# Patient Record
Sex: MALE | Race: WHITE | HISPANIC OR LATINO | Employment: FULL TIME | URBAN - METROPOLITAN AREA
[De-identification: names, ages, dates, MRNs, and addresses within clinical notes are randomized per-mention and may not be internally consistent; named-entity substitution may affect disease eponyms.]

---

## 2024-04-19 ENCOUNTER — OFFICE VISIT (OUTPATIENT)
Dept: URGENT CARE | Facility: URGENT CARE | Age: 24
End: 2024-04-19
Payer: OTHER MISCELLANEOUS

## 2024-04-19 VITALS
HEART RATE: 86 BPM | DIASTOLIC BLOOD PRESSURE: 58 MMHG | TEMPERATURE: 98.2 F | SYSTOLIC BLOOD PRESSURE: 113 MMHG | OXYGEN SATURATION: 99 %

## 2024-04-19 DIAGNOSIS — H91.92 HEARING LOSS OF LEFT EAR, UNSPECIFIED HEARING LOSS TYPE: ICD-10-CM

## 2024-04-19 DIAGNOSIS — Z86.69 HISTORY OF PERFORATED EAR DRUM: Primary | ICD-10-CM

## 2024-04-19 PROCEDURE — 99203 OFFICE O/P NEW LOW 30 MIN: CPT | Performed by: PHYSICIAN ASSISTANT

## 2024-04-19 NOTE — PROGRESS NOTES
Assessment & Plan     History of perforated ear drum    - Adult ENT  Referral    Hearing loss of left ear, unspecified hearing loss type    - Adult ENT  Referral        Patient Instructions   ENT referral completed  Follow-up as needed    Return for Follow up with ENT.     Patient understood and agreed to plan. Patient was stable for discharge.    Cinthia Ware is a 24 year old male who presents to clinic today with  for the following health issues:  Chief Complaint   Patient presents with    Urgent Care     - 2 Weeks  - Rupture in Left Ear  - Was seen in  and was told he needs a refferal to ENT (Never received one from them)  - Worker's Comp team advised that he be seen by another  to receive referral  - Wakes up with hearing loss then during the day, hearing returns to normal   - Ear Pain     HPI    Patient reports ruptured left eardrum which happened 2 weeks ago.  Patient is a  and reports the ruptured eardrum occurred while he was flying. Patient reports hearing loss when he wakes up in the morning which resolves throughout the day.  He also has mild ear pain. Patient was seen in urgent care, 2 weeks ago and was advised to follow-up with ENT. Patient was not given the ENT referral.  He is requesting referral to an ENT.     Review of Systems   HENT:  Positive for ear pain and hearing loss.        Problem List:  There are no relevant problems documented for this patient.      No past medical history on file.    Social History     Tobacco Use    Smoking status: Never     Passive exposure: Never    Smokeless tobacco: Never   Substance Use Topics    Alcohol use: Not on file           Objective    /58   Pulse 86   Temp 98.2  F (36.8  C) (Temporal)   SpO2 99%   Physical Exam  Constitutional:       Appearance: Normal appearance.   HENT:      Head: Normocephalic.      Right Ear: Tympanic membrane normal.      Left Ear: Tympanic membrane normal.      Mouth/Throat:       Pharynx: Uvula midline.   Cardiovascular:      Rate and Rhythm: Normal rate and regular rhythm.   Pulmonary:      Effort: Pulmonary effort is normal.      Breath sounds: Normal breath sounds.   Neurological:      Mental Status: He is alert.   Psychiatric:         Mood and Affect: Mood normal.         Behavior: Behavior normal.              Alison Lo PA-C

## 2024-04-23 ENCOUNTER — TELEPHONE (OUTPATIENT)
Dept: OTOLARYNGOLOGY | Facility: CLINIC | Age: 24
End: 2024-04-23
Payer: COMMERCIAL

## 2024-04-23 NOTE — TELEPHONE ENCOUNTER
This encounter is being sent to inform the clinic that this patient has a referral from Alison Lo for the diagnoses of History of perforated ear drum / Hearing loss of left ear, unspecified hearing loss type  and has requested that this patient be seen within 3-5 days and/or with unknown.  Based on the availability of our provider(s), we are unable to accommodate this request.    Were all sites offered this patient?  Yes    Does scheduling algorithm request to schedule next available?  Patient has been scheduled for the first available opening with Dr Blair on 6/19/24.  We have informed the patient that the clinic will review their referral and reach out if a sooner appointment is medically necessary.

## 2024-04-24 DIAGNOSIS — Z01.10 ENCOUNTER FOR HEARING TEST: Primary | ICD-10-CM

## 2024-05-07 NOTE — TELEPHONE ENCOUNTER
FUTURE VISIT INFORMATION      FUTURE VISIT INFORMATION:  Date: 6/19/24  Time: 7:30 AM  Location: [facility] - ENT  REFERRAL INFORMATION:  Referring provider:    Referring providers clinic:    Reason for visit/diagnosis:  Z86.69 (ICD-10-CM) - History of perforated ear drum  H91.92 (ICD-10-CM) - Hearing loss of left ear, unspecified hearing loss type  Urgent referral from Alison Lo. referral notes in EPIC. Pt unable to work due to issue as he works for [employer]. Pt appt for [facility] location.     RECORDS REQUESTED FROM      Clinic name Comments Records Status Imaging Status   [facility] 4/19/24 OC note- Alison Lo PA-C  [facility] [place], [place]    4/8/24  Note- Ange Palafox PA CE

## 2024-06-19 ENCOUNTER — PRE VISIT (OUTPATIENT)
Dept: OTOLARYNGOLOGY | Facility: CLINIC | Age: 24
End: 2024-06-19

## 2025-06-08 ENCOUNTER — HEALTH MAINTENANCE LETTER (OUTPATIENT)
Age: 25
End: 2025-06-08